# Patient Record
Sex: FEMALE | Race: WHITE
[De-identification: names, ages, dates, MRNs, and addresses within clinical notes are randomized per-mention and may not be internally consistent; named-entity substitution may affect disease eponyms.]

---

## 2017-07-27 ENCOUNTER — HOSPITAL ENCOUNTER (OUTPATIENT)
Dept: HOSPITAL 56 - MW.SDS | Age: 47
Discharge: HOME | End: 2017-07-27
Attending: OBSTETRICS & GYNECOLOGY
Payer: MEDICAID

## 2017-07-27 VITALS — SYSTOLIC BLOOD PRESSURE: 145 MMHG | DIASTOLIC BLOOD PRESSURE: 89 MMHG

## 2017-07-27 DIAGNOSIS — I10: ICD-10-CM

## 2017-07-27 DIAGNOSIS — Z98.890: ICD-10-CM

## 2017-07-27 DIAGNOSIS — Z98.51: ICD-10-CM

## 2017-07-27 DIAGNOSIS — N85.00: Primary | ICD-10-CM

## 2017-07-27 DIAGNOSIS — E03.9: ICD-10-CM

## 2017-07-27 DIAGNOSIS — F41.9: ICD-10-CM

## 2017-07-27 DIAGNOSIS — D25.9: ICD-10-CM

## 2017-07-27 DIAGNOSIS — K21.9: ICD-10-CM

## 2017-07-27 DIAGNOSIS — Z79.899: ICD-10-CM

## 2017-07-27 PROCEDURE — 36415 COLL VENOUS BLD VENIPUNCTURE: CPT

## 2017-07-27 PROCEDURE — 85027 COMPLETE CBC AUTOMATED: CPT

## 2017-07-27 PROCEDURE — 0UDB8ZX EXTRACTION OF ENDOMETRIUM, VIA NATURAL OR ARTIFICIAL OPENING ENDOSCOPIC, DIAGNOSTIC: ICD-10-PCS | Performed by: OBSTETRICS & GYNECOLOGY

## 2017-07-27 PROCEDURE — 84703 CHORIONIC GONADOTROPIN ASSAY: CPT

## 2017-07-27 PROCEDURE — 58558 HYSTEROSCOPY BIOPSY: CPT

## 2017-07-27 RX ADMIN — FENTANYL CITRATE PRN MCG: 50 INJECTION, SOLUTION INTRAMUSCULAR; INTRAVENOUS at 09:55

## 2017-07-27 RX ADMIN — FENTANYL CITRATE PRN MCG: 50 INJECTION, SOLUTION INTRAMUSCULAR; INTRAVENOUS at 10:04

## 2019-02-17 ENCOUNTER — HOSPITAL ENCOUNTER (EMERGENCY)
Dept: HOSPITAL 56 - MW.ED | Age: 49
Discharge: HOME | End: 2019-02-17
Payer: MEDICAID

## 2019-02-17 VITALS — SYSTOLIC BLOOD PRESSURE: 134 MMHG | DIASTOLIC BLOOD PRESSURE: 83 MMHG

## 2019-02-17 DIAGNOSIS — I10: ICD-10-CM

## 2019-02-17 DIAGNOSIS — S89.92XA: Primary | ICD-10-CM

## 2019-02-17 DIAGNOSIS — E03.9: ICD-10-CM
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
## 2019-02-17 DIAGNOSIS — F32.9: ICD-10-CM

## 2019-02-17 DIAGNOSIS — Z79.899: ICD-10-CM

## 2019-02-17 DIAGNOSIS — F41.9: ICD-10-CM

## 2019-02-17 DIAGNOSIS — Z88.2: ICD-10-CM

## 2019-02-17 PROCEDURE — 99283 EMERGENCY DEPT VISIT LOW MDM: CPT

## 2019-02-17 PROCEDURE — 96372 THER/PROPH/DIAG INJ SC/IM: CPT

## 2019-02-17 PROCEDURE — 73562 X-RAY EXAM OF KNEE 3: CPT

## 2019-05-25 ENCOUNTER — HOSPITAL ENCOUNTER (EMERGENCY)
Dept: HOSPITAL 56 - MW.ED | Age: 49
Discharge: HOME | End: 2019-05-25
Payer: MEDICAID

## 2019-05-25 VITALS — SYSTOLIC BLOOD PRESSURE: 107 MMHG | DIASTOLIC BLOOD PRESSURE: 66 MMHG

## 2019-05-25 DIAGNOSIS — L84: Primary | ICD-10-CM

## 2019-05-25 DIAGNOSIS — Z88.2: ICD-10-CM

## 2019-05-25 DIAGNOSIS — Z98.890: ICD-10-CM

## 2019-05-25 DIAGNOSIS — F41.9: ICD-10-CM

## 2019-05-25 DIAGNOSIS — I10: ICD-10-CM

## 2019-05-25 DIAGNOSIS — Z98.51: ICD-10-CM

## 2019-05-25 DIAGNOSIS — Z79.899: ICD-10-CM

## 2019-05-25 DIAGNOSIS — E03.9: ICD-10-CM

## 2019-05-28 ENCOUNTER — HOSPITAL ENCOUNTER (EMERGENCY)
Dept: HOSPITAL 56 - MW.ED | Age: 49
Discharge: HOME | End: 2019-05-28
Payer: MEDICAID

## 2019-05-28 VITALS — SYSTOLIC BLOOD PRESSURE: 130 MMHG | DIASTOLIC BLOOD PRESSURE: 87 MMHG

## 2019-05-28 DIAGNOSIS — E86.0: ICD-10-CM

## 2019-05-28 DIAGNOSIS — K52.9: Primary | ICD-10-CM

## 2019-05-28 DIAGNOSIS — R74.8: ICD-10-CM

## 2019-05-28 DIAGNOSIS — E03.9: ICD-10-CM

## 2019-05-28 DIAGNOSIS — Z79.899: ICD-10-CM

## 2019-05-28 DIAGNOSIS — Z88.2: ICD-10-CM

## 2019-05-28 DIAGNOSIS — F41.9: ICD-10-CM

## 2019-05-28 LAB
CHLORIDE SERPL-SCNC: 103 MMOL/L (ref 98–107)
SODIUM SERPL-SCNC: 142 MMOL/L (ref 136–145)

## 2019-05-28 PROCEDURE — 74177 CT ABD & PELVIS W/CONTRAST: CPT

## 2019-05-28 PROCEDURE — 81001 URINALYSIS AUTO W/SCOPE: CPT

## 2019-05-28 PROCEDURE — 84703 CHORIONIC GONADOTROPIN ASSAY: CPT

## 2019-05-28 PROCEDURE — 99284 EMERGENCY DEPT VISIT MOD MDM: CPT

## 2019-05-28 PROCEDURE — 80053 COMPREHEN METABOLIC PANEL: CPT

## 2019-05-28 PROCEDURE — 96361 HYDRATE IV INFUSION ADD-ON: CPT

## 2019-05-28 PROCEDURE — 83690 ASSAY OF LIPASE: CPT

## 2019-05-28 PROCEDURE — 85025 COMPLETE CBC W/AUTO DIFF WBC: CPT

## 2019-05-28 PROCEDURE — 36415 COLL VENOUS BLD VENIPUNCTURE: CPT

## 2019-05-28 PROCEDURE — 96374 THER/PROPH/DIAG INJ IV PUSH: CPT

## 2019-07-12 ENCOUNTER — HOSPITAL ENCOUNTER (EMERGENCY)
Dept: HOSPITAL 56 - MW.ED | Age: 49
Discharge: HOME | End: 2019-07-12
Payer: COMMERCIAL

## 2019-07-12 VITALS — SYSTOLIC BLOOD PRESSURE: 156 MMHG | DIASTOLIC BLOOD PRESSURE: 104 MMHG

## 2019-07-12 DIAGNOSIS — F41.9: Primary | ICD-10-CM

## 2019-07-12 DIAGNOSIS — Z79.899: ICD-10-CM

## 2019-07-12 DIAGNOSIS — F32.9: ICD-10-CM

## 2019-07-12 DIAGNOSIS — E03.9: ICD-10-CM

## 2019-07-12 DIAGNOSIS — I10: ICD-10-CM

## 2019-07-12 DIAGNOSIS — Z88.1: ICD-10-CM

## 2019-07-12 DIAGNOSIS — Z88.2: ICD-10-CM

## 2020-08-16 ENCOUNTER — HOSPITAL ENCOUNTER (EMERGENCY)
Dept: HOSPITAL 7 - FB.ED | Age: 50
Discharge: HOME | End: 2020-08-16
Payer: SELF-PAY

## 2020-08-16 DIAGNOSIS — Z88.2: ICD-10-CM

## 2020-08-16 DIAGNOSIS — M23.92: ICD-10-CM

## 2020-08-16 DIAGNOSIS — M25.462: Primary | ICD-10-CM

## 2020-08-16 DIAGNOSIS — I10: ICD-10-CM

## 2020-08-16 DIAGNOSIS — Z88.1: ICD-10-CM

## 2020-08-16 NOTE — EDM.PDOC
ED HPI GENERAL MEDICAL PROBLEM





- General


Chief Complaint: Lower Extremity Injury/Pain


Stated Complaint: LEG INJ


Time Seen by Provider: 20 15:15


Source of Information: Reports: Patient


History Limitations: Reports: No Limitations





- History of Present Illness


INITIAL COMMENTS - FREE TEXT/NARRATIVE: 





c/o L knee pain





playing tennis with son, went for ball and knee locked, she pitched forward onto

her hands, she had pain in the knee





denies buckling, had pain and had to stop playing tennis, has been able to walk 

on it, no inc'd pain when standing





taking Advil 2 tabs q4-6h which helps





no previous known injury, did play vball and basketball in HS





she has a local 24 yo son going into the , she has a local 10 yo dtr who 

spent the summer with her father and will go back to [facility] in 6d to spend 

the school yr with pt





pt's mother is from [place], mother  recently and pt getting the house ready 

to sell, pt and her dtr will live elsewhere in [place] when the house sells





pt on [place] Medicaid


Treatments PTA: Reports: Cold Therapy, NSAIDS


  ** Left Knee


Pain Score (Numeric/FACES): 7





- Related Data


                                    Allergies











Allergy/AdvReac Type Severity Reaction Status Date / Time


 


sulfamethoxazole Allergy  Hives Verified 20 15:09





[From Bactrim]     


 


trimethoprim [From Bactrim] Allergy  Hives Verified 20 15:09














Past Medical History


Cardiovascular History: Reports: Hypertension


Psychiatric History: Reports: Anxiety, Depression


Endocrine/Metabolic History: Reports: Hypothyroidism





Social & Family History





- Tobacco Use


Smoking Status *Q: Never Smoker


Second Hand Smoke Exposure: No





- Caffeine Use


Caffeine Use: Reports: Coffee





- Recreational Drug Use


Recreational Drug Use: No





Review of Systems





- Review of Systems


Review Of Systems: See Below


Constitutional: Reports: No Symptoms


Eyes: Reports: No Symptoms


Ears: Reports: No Symptoms


Nose: Reports: No Symptoms


Mouth/Throat: Reports: No Symptoms


Respiratory: Reports: No Symptoms


Cardiovascular: Reports: No Symptoms


GI/Abdominal: Reports: No Symptoms


Genitourinary: Reports: No Symptoms


Musculoskeletal: Reports: Joint Pain, Other (inc'd pain with flex)


Skin: Reports: No Symptoms


Neurological: Reports: No Symptoms


Psychiatric: Reports: No Symptoms





ED EXAM, GENERAL





- Physical Exam


Exam: See Below


Exam Limited By: No Limitations


General Appearance: Alert, WD/WN, No Apparent Distress


Nose: Normal Inspection


Head: Atraumatic


Neck: Normal Inspection


Respiratory/Chest: No Respiratory Distress


Cardiovascular: Regular Rate, Rhythm


Back Exam: Normal Inspection


Extremities: Other (R LCL/MCL NT, patella retinaculum NT, patella ballottable 

and there is a moderate effusion with patella "floating" on a visible anterior 

effusion, flex to 90 degrees only, limited McMurrays is neg, Lachman with a less

distinct endpoint on L, no ecchymosis, no point tender)


Neurological: Alert, Oriented, CN II-XII Intact, Normal Cognition, No 

Motor/Sensory Deficits


Psychiatric: Normal Affect, Normal Mood


Skin Exam: Warm, Dry, Intact, Normal Color, No Rash


Lymphatic: No Adenopathy





Course





- Vital Signs


Last Recorded V/S: 





                                Last Vital Signs











Temp  36.7 C   20 14:50


 


Pulse  91   20 14:50


 


Resp  16   20 14:50


 


BP  126/99 H  20 14:50


 


Pulse Ox  99   20 14:50














- Orders/Labs/Meds


Orders: 





                               Active Orders 24 hr











 Category Date Time Status


 


 Knee Min 4V Lt [CR] Stat Exams  20 14:58 Taken














- Re-Assessments/Exams


Free Text/Narrative Re-Assessment/Exam: 





20 15:58


likely previous unknown meniscal tearing (locking) and further meniscal and ACL 

injury





pt cautioned to avoid reinjury, may walk "gingerly" as she has been doing but is

 to avoid sudden movements





no knee braces in ED, however a 6" Ace should provide adequate support pending 

ortho eval back in [place] next wk





prelim ED read of R knee XR with no fx or bone chips, effusion present





Departure





- Departure


Time of Disposition: 15:46


Disposition: Home, Self-Care 01


Condition: Good


Clinical Impression: 


 Derangement of knee, Knee effusion, left








- Discharge Information


*PRESCRIPTION DRUG MONITORING PROGRAM REVIEWED*: Not Applicable


*COPY OF PRESCRIPTION DRUG MONITORING REPORT IN PATIENT [facility]: Not Applicable


Instructions:  Knee Effusion, Meniscus Tear


Additional Instructions: 


It is likely that you have a tear in both the cartilage and ACL.





You will want to avoid sudden movement or bending or twisting.





Use the Ace wrap to provide external support and stability.





May use ice for 10 minutes every 2 hours while awake as needed.





For the next week, take ibuprofen 200 mg 4 tabs and acetaminophen 500 mg 2 tabs 

3 times a day, longer if needed.





See orthopedic surgeon next week for further evaluation and recommendations.





Sepsis Event Note (ED)





- Evaluation


Sepsis Screening Result: No Definite Risk





- Focused Exam


Vital Signs: 





                                   Vital Signs











  Temp Pulse Resp BP Pulse Ox


 


 20 14:50  36.7 C  91  16  126/99 H  99














- My Orders


Last 24 Hours: 





My Active Orders





20 14:58


Knee Min 4V Lt [CR] Stat 














- Assessment/Plan


Last 24 Hours: 





My Active Orders





20 14:58


Knee Min 4V Lt [CR] Stat

## 2020-08-17 NOTE — CR
INDICATION: Knee pain. 



LEFT KNEE:  Three views of the left knee were obtained and reveal evidence of a 
large knee joint effusion, which should be correlated clinically. 



Mild hypertrophic changes are noted at the medial intercondylar spine and very 
minimally at the patellofemoral joint. 



Patellofemoral and femorotibial joint spaces appear to be well maintained. 



Bone density appears to be normal.



A definite fracture or dislocation was not identified.  



IMPRESSION: 

1.  Large knee joint effusion. 

2.  Minimal osteoarthritis. 

MTDD